# Patient Record
Sex: MALE | Race: WHITE | Employment: UNEMPLOYED | URBAN - METROPOLITAN AREA
[De-identification: names, ages, dates, MRNs, and addresses within clinical notes are randomized per-mention and may not be internally consistent; named-entity substitution may affect disease eponyms.]

---

## 2018-01-22 PROBLEM — Z12.11 ENCOUNTER FOR SCREENING FECAL OCCULT BLOOD TESTING: Status: ACTIVE | Noted: 2018-01-22

## 2018-01-22 PROBLEM — I10 ESSENTIAL HYPERTENSION: Status: ACTIVE | Noted: 2018-01-22

## 2018-01-22 PROBLEM — Z11.59 ENCOUNTER FOR HEPATITIS C SCREENING TEST FOR LOW RISK PATIENT: Status: ACTIVE | Noted: 2018-01-22

## 2018-01-22 PROBLEM — M19.90 ARTHRITIS: Status: ACTIVE | Noted: 2018-01-22

## 2018-01-22 PROBLEM — Z23 IMMUNIZATION DUE: Status: ACTIVE | Noted: 2018-01-22

## 2018-01-23 PROBLEM — J30.2 ACUTE SEASONAL ALLERGIC RHINITIS: Status: ACTIVE | Noted: 2018-01-23

## 2018-02-28 PROBLEM — Z23 IMMUNIZATION DUE: Status: RESOLVED | Noted: 2018-01-22 | Resolved: 2018-02-28

## 2018-02-28 PROBLEM — Z86.73 HISTORY OF STROKE: Status: ACTIVE | Noted: 2018-02-28

## 2018-02-28 PROBLEM — F41.9 ANXIETY: Status: ACTIVE | Noted: 2018-02-28

## 2018-02-28 PROBLEM — Z11.59 ENCOUNTER FOR HEPATITIS C SCREENING TEST FOR LOW RISK PATIENT: Status: RESOLVED | Noted: 2018-01-22 | Resolved: 2018-02-28

## 2018-02-28 PROBLEM — Z12.11 ENCOUNTER FOR SCREENING FECAL OCCULT BLOOD TESTING: Status: RESOLVED | Noted: 2018-01-22 | Resolved: 2018-02-28

## 2018-03-06 ENCOUNTER — HOSPITAL ENCOUNTER (OUTPATIENT)
Dept: ULTRASOUND IMAGING | Age: 63
Discharge: HOME | End: 2018-03-06
Attending: FAMILY MEDICINE
Payer: COMMERCIAL

## 2018-03-06 DIAGNOSIS — Z86.73 HISTORY OF STROKE: ICD-10-CM

## 2018-03-06 DIAGNOSIS — I10 ESSENTIAL HYPERTENSION: ICD-10-CM

## 2018-03-06 PROCEDURE — 93880 EXTRACRANIAL BILAT STUDY: CPT

## 2018-03-07 NOTE — PROGRESS NOTES
Reviewed results with the patient. He is requesting an opthomologist referral for cataracts and stigmatism correction.  Please advise

## 2018-03-28 PROBLEM — E66.01 SEVERE OBESITY (BMI 35.0-39.9) WITH COMORBIDITY (HCC): Status: ACTIVE | Noted: 2018-03-28

## 2018-04-23 RX ORDER — OXYCODONE HYDROCHLORIDE 5 MG/1
5 TABLET ORAL
Status: CANCELLED | OUTPATIENT
Start: 2018-04-23

## 2018-04-23 RX ORDER — HYDROMORPHONE HYDROCHLORIDE 2 MG/ML
0.5 INJECTION, SOLUTION INTRAMUSCULAR; INTRAVENOUS; SUBCUTANEOUS
Status: CANCELLED | OUTPATIENT
Start: 2018-04-23

## 2018-04-23 RX ORDER — NALOXONE HYDROCHLORIDE 0.4 MG/ML
0.1 INJECTION, SOLUTION INTRAMUSCULAR; INTRAVENOUS; SUBCUTANEOUS
Status: CANCELLED | OUTPATIENT
Start: 2018-04-23

## 2018-04-23 RX ORDER — FLUMAZENIL 0.1 MG/ML
0.2 INJECTION INTRAVENOUS
Status: CANCELLED | OUTPATIENT
Start: 2018-04-23

## 2018-04-23 RX ORDER — SODIUM CHLORIDE 0.9 % (FLUSH) 0.9 %
5-10 SYRINGE (ML) INJECTION AS NEEDED
Status: CANCELLED | OUTPATIENT
Start: 2018-04-23

## 2018-04-23 RX ORDER — NALBUPHINE HYDROCHLORIDE 20 MG/ML
5 INJECTION, SOLUTION INTRAMUSCULAR; INTRAVENOUS; SUBCUTANEOUS
Status: CANCELLED | OUTPATIENT
Start: 2018-04-23

## 2018-04-24 ENCOUNTER — HOSPITAL ENCOUNTER (OUTPATIENT)
Age: 63
Setting detail: OUTPATIENT SURGERY
Discharge: HOME | End: 2018-04-24
Attending: OPHTHALMOLOGY | Admitting: OPHTHALMOLOGY

## 2018-04-24 VITALS
SYSTOLIC BLOOD PRESSURE: 156 MMHG | RESPIRATION RATE: 18 BRPM | HEART RATE: 91 BPM | TEMPERATURE: 98.8 F | BODY MASS INDEX: 36.01 KG/M2 | WEIGHT: 251 LBS | DIASTOLIC BLOOD PRESSURE: 96 MMHG | OXYGEN SATURATION: 95 %

## 2018-04-24 RX ORDER — PHENYLEPHRINE HYDROCHLORIDE 25 MG/ML
1 SOLUTION/ DROPS OPHTHALMIC
Status: DISCONTINUED | OUTPATIENT
Start: 2018-04-24 | End: 2018-04-24 | Stop reason: HOSPADM

## 2018-04-24 RX ORDER — MIDAZOLAM HYDROCHLORIDE 1 MG/ML
2 INJECTION, SOLUTION INTRAMUSCULAR; INTRAVENOUS
Status: DISCONTINUED | OUTPATIENT
Start: 2018-04-24 | End: 2018-04-24 | Stop reason: HOSPADM

## 2018-04-24 RX ORDER — CYCLOPENTOLATE HYDROCHLORIDE 20 MG/ML
1 SOLUTION/ DROPS OPHTHALMIC
Status: DISCONTINUED | OUTPATIENT
Start: 2018-04-24 | End: 2018-04-24 | Stop reason: HOSPADM

## 2018-04-24 RX ORDER — LIDOCAINE HYDROCHLORIDE 10 MG/ML
0.1 INJECTION INFILTRATION; PERINEURAL AS NEEDED
Status: DISCONTINUED | OUTPATIENT
Start: 2018-04-24 | End: 2018-04-24 | Stop reason: HOSPADM

## 2018-04-24 RX ORDER — SODIUM CHLORIDE, SODIUM LACTATE, POTASSIUM CHLORIDE, CALCIUM CHLORIDE 600; 310; 30; 20 MG/100ML; MG/100ML; MG/100ML; MG/100ML
100 INJECTION, SOLUTION INTRAVENOUS CONTINUOUS
Status: DISCONTINUED | OUTPATIENT
Start: 2018-04-24 | End: 2018-04-24 | Stop reason: HOSPADM

## 2018-04-24 RX ORDER — SODIUM CHLORIDE 0.9 % (FLUSH) 0.9 %
5-10 SYRINGE (ML) INJECTION AS NEEDED
Status: DISCONTINUED | OUTPATIENT
Start: 2018-04-24 | End: 2018-04-24 | Stop reason: HOSPADM

## 2018-04-24 RX ORDER — TROPICAMIDE 10 MG/ML
1 SOLUTION/ DROPS OPHTHALMIC
Status: DISCONTINUED | OUTPATIENT
Start: 2018-04-24 | End: 2018-04-24 | Stop reason: HOSPADM

## 2018-04-24 RX ORDER — CIPROFLOXACIN HYDROCHLORIDE 3.5 MG/ML
1 SOLUTION/ DROPS TOPICAL
Status: DISCONTINUED | OUTPATIENT
Start: 2018-04-24 | End: 2018-04-24 | Stop reason: HOSPADM

## 2018-04-24 RX ORDER — SODIUM CHLORIDE 0.9 % (FLUSH) 0.9 %
5-10 SYRINGE (ML) INJECTION EVERY 8 HOURS
Status: DISCONTINUED | OUTPATIENT
Start: 2018-04-24 | End: 2018-04-24 | Stop reason: HOSPADM

## 2018-08-07 PROBLEM — E55.9 VITAMIN D DEFICIENCY: Status: ACTIVE | Noted: 2018-08-07

## 2018-08-07 PROBLEM — R26.9 GAIT ABNORMALITY: Status: ACTIVE | Noted: 2018-08-07

## 2019-04-15 ENCOUNTER — APPOINTMENT (OUTPATIENT)
Dept: GENERAL RADIOLOGY | Age: 64
DRG: 720 | End: 2019-04-15
Attending: EMERGENCY MEDICINE
Payer: COMMERCIAL

## 2019-04-15 ENCOUNTER — HOSPITAL ENCOUNTER (INPATIENT)
Age: 64
LOS: 1 days | Discharge: HOME | DRG: 720 | End: 2019-04-16
Attending: EMERGENCY MEDICINE | Admitting: INTERNAL MEDICINE
Payer: COMMERCIAL

## 2019-04-15 DIAGNOSIS — R06.03 RESPIRATORY DISTRESS: ICD-10-CM

## 2019-04-15 DIAGNOSIS — J18.9 COMMUNITY ACQUIRED PNEUMONIA OF RIGHT MIDDLE LOBE OF LUNG: Primary | ICD-10-CM

## 2019-04-15 LAB
ALBUMIN SERPL-MCNC: 2.7 G/DL (ref 3.2–4.6)
ALBUMIN/GLOB SERPL: 0.7 {RATIO} (ref 1.2–3.5)
ALP SERPL-CCNC: 73 U/L (ref 50–136)
ALT SERPL-CCNC: 24 U/L (ref 12–65)
ANION GAP SERPL CALC-SCNC: 7 MMOL/L (ref 7–16)
APPEARANCE UR: CLEAR
AST SERPL-CCNC: 19 U/L (ref 15–37)
ATRIAL RATE: 128 BPM
BACTERIA URNS QL MICRO: ABNORMAL /HPF
BASOPHILS # BLD: 0 K/UL (ref 0–0.2)
BASOPHILS NFR BLD: 0 % (ref 0–2)
BILIRUB SERPL-MCNC: 0.6 MG/DL (ref 0.2–1.1)
BILIRUB UR QL: NEGATIVE
BNP SERPL-MCNC: 60 PG/ML
BUN SERPL-MCNC: 11 MG/DL (ref 8–23)
CALCIUM SERPL-MCNC: 8 MG/DL (ref 8.3–10.4)
CALCULATED P AXIS, ECG09: 75 DEGREES
CALCULATED R AXIS, ECG10: -7 DEGREES
CALCULATED T AXIS, ECG11: 62 DEGREES
CHLORIDE SERPL-SCNC: 103 MMOL/L (ref 98–107)
CO2 SERPL-SCNC: 25 MMOL/L (ref 21–32)
COLOR UR: YELLOW
CREAT SERPL-MCNC: 1.17 MG/DL (ref 0.8–1.5)
DIAGNOSIS, 93000: NORMAL
DIFFERENTIAL METHOD BLD: ABNORMAL
EOSINOPHIL # BLD: 0 K/UL (ref 0–0.8)
EOSINOPHIL NFR BLD: 0 % (ref 0.5–7.8)
ERYTHROCYTE [DISTWIDTH] IN BLOOD BY AUTOMATED COUNT: 13.4 % (ref 11.9–14.6)
GLOBULIN SER CALC-MCNC: 3.7 G/DL (ref 2.3–3.5)
GLUCOSE SERPL-MCNC: 113 MG/DL (ref 65–100)
GLUCOSE UR STRIP.AUTO-MCNC: NEGATIVE MG/DL
HCT VFR BLD AUTO: 42.7 % (ref 41.1–50.3)
HGB BLD-MCNC: 14 G/DL (ref 13.6–17.2)
HGB UR QL STRIP: ABNORMAL
IMM GRANULOCYTES # BLD AUTO: 0.1 K/UL (ref 0–0.5)
IMM GRANULOCYTES NFR BLD AUTO: 0 % (ref 0–5)
KETONES UR QL STRIP.AUTO: NEGATIVE MG/DL
LACTATE BLD-SCNC: 1.23 MMOL/L (ref 0.5–1.9)
LEUKOCYTE ESTERASE UR QL STRIP.AUTO: NEGATIVE
LYMPHOCYTES # BLD: 1 K/UL (ref 0.5–4.6)
LYMPHOCYTES NFR BLD: 8 % (ref 13–44)
MCH RBC QN AUTO: 31 PG (ref 26.1–32.9)
MCHC RBC AUTO-ENTMCNC: 32.8 G/DL (ref 31.4–35)
MCV RBC AUTO: 94.7 FL (ref 79.6–97.8)
MONOCYTES # BLD: 1.7 K/UL (ref 0.1–1.3)
MONOCYTES NFR BLD: 13 % (ref 4–12)
NEUTS SEG # BLD: 10.3 K/UL (ref 1.7–8.2)
NEUTS SEG NFR BLD: 79 % (ref 43–78)
NITRITE UR QL STRIP.AUTO: NEGATIVE
NRBC # BLD: 0 K/UL (ref 0–0.2)
P-R INTERVAL, ECG05: 168 MS
PH UR STRIP: 6 [PH] (ref 5–9)
PLATELET # BLD AUTO: 284 K/UL (ref 150–450)
PMV BLD AUTO: 12.1 FL (ref 9.4–12.3)
POTASSIUM SERPL-SCNC: 3.5 MMOL/L (ref 3.5–5.1)
PROCALCITONIN SERPL-MCNC: 0.2 NG/ML
PROT SERPL-MCNC: 6.4 G/DL (ref 6.3–8.2)
PROT UR STRIP-MCNC: ABNORMAL MG/DL
Q-T INTERVAL, ECG07: 288 MS
QRS DURATION, ECG06: 76 MS
QTC CALCULATION (BEZET), ECG08: 420 MS
RBC # BLD AUTO: 4.51 M/UL (ref 4.23–5.6)
SODIUM SERPL-SCNC: 135 MMOL/L (ref 136–145)
SP GR UR REFRACTOMETRY: 1.01 (ref 1–1.02)
TROPONIN I BLD-MCNC: 0 NG/ML (ref 0.02–0.05)
UROBILINOGEN UR QL STRIP.AUTO: 0.2 EU/DL (ref 0.2–1)
VENTRICULAR RATE, ECG03: 128 BPM
WBC # BLD AUTO: 13.1 K/UL (ref 4.3–11.1)

## 2019-04-15 PROCEDURE — 96360 HYDRATION IV INFUSION INIT: CPT | Performed by: EMERGENCY MEDICINE

## 2019-04-15 PROCEDURE — 71046 X-RAY EXAM CHEST 2 VIEWS: CPT

## 2019-04-15 PROCEDURE — 99285 EMERGENCY DEPT VISIT HI MDM: CPT | Performed by: EMERGENCY MEDICINE

## 2019-04-15 PROCEDURE — 83605 ASSAY OF LACTIC ACID: CPT

## 2019-04-15 PROCEDURE — 94640 AIRWAY INHALATION TREATMENT: CPT

## 2019-04-15 PROCEDURE — 74011250636 HC RX REV CODE- 250/636: Performed by: EMERGENCY MEDICINE

## 2019-04-15 PROCEDURE — 80053 COMPREHEN METABOLIC PANEL: CPT

## 2019-04-15 PROCEDURE — 87449 NOS EACH ORGANISM AG IA: CPT

## 2019-04-15 PROCEDURE — 74011250636 HC RX REV CODE- 250/636: Performed by: INTERNAL MEDICINE

## 2019-04-15 PROCEDURE — 81001 URINALYSIS AUTO W/SCOPE: CPT

## 2019-04-15 PROCEDURE — 83880 ASSAY OF NATRIURETIC PEPTIDE: CPT

## 2019-04-15 PROCEDURE — 74011250637 HC RX REV CODE- 250/637: Performed by: INTERNAL MEDICINE

## 2019-04-15 PROCEDURE — 74011000250 HC RX REV CODE- 250: Performed by: INTERNAL MEDICINE

## 2019-04-15 PROCEDURE — 87040 BLOOD CULTURE FOR BACTERIA: CPT

## 2019-04-15 PROCEDURE — 65660000000 HC RM CCU STEPDOWN

## 2019-04-15 PROCEDURE — 84145 PROCALCITONIN (PCT): CPT

## 2019-04-15 PROCEDURE — 84484 ASSAY OF TROPONIN QUANT: CPT

## 2019-04-15 PROCEDURE — 85025 COMPLETE CBC W/AUTO DIFF WBC: CPT

## 2019-04-15 PROCEDURE — 93005 ELECTROCARDIOGRAM TRACING: CPT | Performed by: EMERGENCY MEDICINE

## 2019-04-15 PROCEDURE — 36415 COLL VENOUS BLD VENIPUNCTURE: CPT

## 2019-04-15 PROCEDURE — 74011000258 HC RX REV CODE- 258: Performed by: EMERGENCY MEDICINE

## 2019-04-15 RX ORDER — HYDRALAZINE HYDROCHLORIDE 20 MG/ML
10 INJECTION INTRAMUSCULAR; INTRAVENOUS
Status: DISCONTINUED | OUTPATIENT
Start: 2019-04-15 | End: 2019-04-16 | Stop reason: HOSPADM

## 2019-04-15 RX ORDER — ACETAMINOPHEN 325 MG/1
650 TABLET ORAL
Status: DISCONTINUED | OUTPATIENT
Start: 2019-04-15 | End: 2019-04-16 | Stop reason: HOSPADM

## 2019-04-15 RX ORDER — HEPARIN SODIUM 5000 [USP'U]/ML
5000 INJECTION, SOLUTION INTRAVENOUS; SUBCUTANEOUS EVERY 12 HOURS
Status: DISCONTINUED | OUTPATIENT
Start: 2019-04-15 | End: 2019-04-16 | Stop reason: HOSPADM

## 2019-04-15 RX ORDER — ALBUTEROL SULFATE 0.83 MG/ML
2.5 SOLUTION RESPIRATORY (INHALATION)
Status: DISCONTINUED | OUTPATIENT
Start: 2019-04-15 | End: 2019-04-16 | Stop reason: HOSPADM

## 2019-04-15 RX ORDER — TRAMADOL HYDROCHLORIDE 50 MG/1
50 TABLET ORAL
Status: DISCONTINUED | OUTPATIENT
Start: 2019-04-15 | End: 2019-04-16 | Stop reason: HOSPADM

## 2019-04-15 RX ORDER — ZOLPIDEM TARTRATE 5 MG/1
5 TABLET ORAL
Status: DISPENSED | OUTPATIENT
Start: 2019-04-15 | End: 2019-04-16

## 2019-04-15 RX ORDER — HEPARIN SODIUM 5000 [USP'U]/ML
5000 INJECTION, SOLUTION INTRAVENOUS; SUBCUTANEOUS EVERY 12 HOURS
Status: CANCELLED | OUTPATIENT
Start: 2019-04-15

## 2019-04-15 RX ORDER — ATORVASTATIN CALCIUM 40 MG/1
40 TABLET, FILM COATED ORAL
Status: DISCONTINUED | OUTPATIENT
Start: 2019-04-15 | End: 2019-04-16 | Stop reason: HOSPADM

## 2019-04-15 RX ORDER — SODIUM CHLORIDE 9 MG/ML
125 INJECTION, SOLUTION INTRAVENOUS CONTINUOUS
Status: DISCONTINUED | OUTPATIENT
Start: 2019-04-15 | End: 2019-04-16 | Stop reason: HOSPADM

## 2019-04-15 RX ORDER — HYDROCODONE BITARTRATE AND HOMATROPINE METHYLBROMIDE 1.5; 5 MG/5ML; MG/5ML
5 SYRUP ORAL
Status: DISCONTINUED | OUTPATIENT
Start: 2019-04-15 | End: 2019-04-16 | Stop reason: HOSPADM

## 2019-04-15 RX ORDER — ASPIRIN 81 MG/1
81 TABLET ORAL DAILY
Status: DISCONTINUED | OUTPATIENT
Start: 2019-04-16 | End: 2019-04-16 | Stop reason: HOSPADM

## 2019-04-15 RX ORDER — SODIUM CHLORIDE 0.9 % (FLUSH) 0.9 %
5-40 SYRINGE (ML) INJECTION AS NEEDED
Status: DISCONTINUED | OUTPATIENT
Start: 2019-04-15 | End: 2019-04-16 | Stop reason: HOSPADM

## 2019-04-15 RX ORDER — ONDANSETRON 2 MG/ML
4 INJECTION INTRAMUSCULAR; INTRAVENOUS
Status: DISCONTINUED | OUTPATIENT
Start: 2019-04-15 | End: 2019-04-16 | Stop reason: HOSPADM

## 2019-04-15 RX ORDER — SODIUM CHLORIDE 0.9 % (FLUSH) 0.9 %
5-40 SYRINGE (ML) INJECTION EVERY 8 HOURS
Status: DISCONTINUED | OUTPATIENT
Start: 2019-04-15 | End: 2019-04-16 | Stop reason: HOSPADM

## 2019-04-15 RX ORDER — GUAIFENESIN 600 MG/1
600 TABLET, EXTENDED RELEASE ORAL EVERY 12 HOURS
Status: DISCONTINUED | OUTPATIENT
Start: 2019-04-15 | End: 2019-04-16 | Stop reason: HOSPADM

## 2019-04-15 RX ORDER — BUDESONIDE 0.5 MG/2ML
500 INHALANT ORAL
Status: DISCONTINUED | OUTPATIENT
Start: 2019-04-15 | End: 2019-04-16 | Stop reason: HOSPADM

## 2019-04-15 RX ORDER — FLUTICASONE PROPIONATE 50 MCG
2 SPRAY, SUSPENSION (ML) NASAL DAILY
Status: DISCONTINUED | OUTPATIENT
Start: 2019-04-16 | End: 2019-04-16 | Stop reason: HOSPADM

## 2019-04-15 RX ADMIN — SODIUM CHLORIDE 125 ML/HR: 900 INJECTION, SOLUTION INTRAVENOUS at 23:00

## 2019-04-15 RX ADMIN — AZITHROMYCIN MONOHYDRATE 500 MG: 500 INJECTION, POWDER, LYOPHILIZED, FOR SOLUTION INTRAVENOUS at 20:45

## 2019-04-15 RX ADMIN — ATORVASTATIN CALCIUM 40 MG: 40 TABLET, FILM COATED ORAL at 22:10

## 2019-04-15 RX ADMIN — HYDROCODONE BITARTRATE AND HOMATROPINE METHYLBROMIDE 5 ML: 5; 1.5 SOLUTION ORAL at 22:10

## 2019-04-15 RX ADMIN — CEFTRIAXONE 2 G: 2 INJECTION, POWDER, FOR SOLUTION INTRAMUSCULAR; INTRAVENOUS at 21:15

## 2019-04-15 RX ADMIN — BUDESONIDE 500 MCG: 0.5 INHALANT RESPIRATORY (INHALATION) at 22:49

## 2019-04-15 RX ADMIN — GUAIFENESIN 600 MG: 600 TABLET ORAL at 22:10

## 2019-04-15 RX ADMIN — FAMOTIDINE 20 MG: 10 INJECTION, SOLUTION INTRAVENOUS at 22:10

## 2019-04-15 RX ADMIN — ALBUTEROL SULFATE 2.5 MG: 2.5 SOLUTION RESPIRATORY (INHALATION) at 22:49

## 2019-04-15 RX ADMIN — HEPARIN SODIUM 5000 UNITS: 5000 INJECTION INTRAVENOUS; SUBCUTANEOUS at 22:10

## 2019-04-15 RX ADMIN — SODIUM CHLORIDE 1000 ML: 900 INJECTION, SOLUTION INTRAVENOUS at 18:15

## 2019-04-15 NOTE — H&P
HOSPITALIST H&P/CONSULTNAME:  Dayana Celaya Age:  59 y.o. 
:   1955 MRN:   [id number] PCP: Eros Vanessa MD 
Consulting MD: Treatment Team: Attending Provider: Getachew Spear MD; Primary Nurse: Peng Joshi RN 
HPI:  
 
CC: cough and SOB This is a 60 YO male patient with a PMH of HTN, previous hemorrhagic CVA, renal stones, obesity who came to the ER complaining of 10 days of having palpitations, progressive SOB, cough with yellow sputum production. He denies fever or chills. He also complains of epigastric pain, burning in nature, 5/10 and he also has bilateral pleuritic chest pain. The patient also reported diarrhea with watery stool for the last 2 days. In the ER he was found to have BP of 146/75, , RR 20, O2 sat 89%. He was in moderate distress and had rhonchi in the R lung area. He was tachycardic. No leg edema found. His initial blood workup reported 13K, his BMP is still pending, his chest x ray reported a R middle lobe infiltrate. His EKG showed sinus tachy and his initial lactic acid was 1.23. He was started on IV ceftriaxone + zithromax. Received IV fluids and nebs and was presented to the hospitalist for admission. Case was discussed with ER MD. 
10 point ROS done and is negative except as noted in HPI. Past Medical History:  
Diagnosis Date  Anxiety  Asthma  Back pain, chronic  Calculus of kidney  GERD (gastroesophageal reflux disease)  Hypercholesterolemia  Hypertension  Osteoarthritis  Rheumatoid arthritis ([facility] [address]  [facility]  Thromboembolus ([facility] No past surgical history on file. Prior to Admission Medications Prescriptions Last Dose Informant Patient Reported? Taking? OTHER   No No  
Sig: Shoe insert for lateral heal strike gait  
aspirin delayed-release 81 mg tablet   Yes No  
Sig: Take 81 mg by mouth daily. atorvastatin (LIPITOR) 80 mg tablet   No No  
Sig: Take 1 Tab by mouth daily. fluticasone (FLONASE) 50 mcg/actuation nasal spray   No No  
Si Sprays by Both Nostrils route daily. glucosamine HCl/chondroitin oliveros (GLUCOSAMINE-CHONDROITIN PO)   Yes No  
Sig: Take  by mouth. hydroCHLOROthiazide (HYDRODIURIL) 25 mg tablet   No No  
Sig: TAKE ONE TABLET BY MOUTH EVERY DAY  
lisinopril (PRINIVIL, ZESTRIL) 40 mg tablet   No No  
Sig: TAKE ONE TABLET BY MOUTH EVERY DAY Facility-Administered Medications: None Home meds reconciled. No Known Allergies Social History Tobacco Use  Smoking status: Former Smoker  Smokeless tobacco: Never Used Substance Use Topics  Alcohol use: Yes Alcohol/week: 0.0 oz  
  Comment: occasional   
  
Family History Problem Relation Age of Onset  Diabetes Mother  Heart Disease Mother  Stroke Mother  Hypertension Mother  No Known Problems Father Immunization History Administered Date(s) Administered  Tdap 2013 Objective:  
 
Visit Vitals /70 Pulse (!) 149 Temp 98.9 °F (37.2 °C) Resp 20 Ht 5' 10\" (1.778 m) Wt 117.9 kg (260 lb) SpO2 92% BMI 37.31 kg/m² Temp (24hrs), Av.9 °F (37.2 °C), Min:98.9 °F (37.2 °C), Max:98.9 °F (37.2 °C) Oxygen Therapy O2 Sat (%): 92 % (04/15/19 180) Pulse via Oximetry: 149 beats per minute (04/15/19 1801) O2 Device: Nasal cannula (04/15/19 1813) O2 Flow Rate (L/min): 2 l/min (04/15/19 1813) Physical Exam: 
General:    Alert, cooperative, moderate distress Head:   NCAT. No obvious deformity Nose:  Nares normal. No drainage Lungs:   Rhonchi in the R lung area Heart:   RRR. No m/r/g. Abdomen:   S/nt/nd. Bowel sounds normal.  
Extremities: No cyanosis. Skin:     No rashes or lesions. Not Jaundiced Neurologic: Moves all extremities. no gross focal deficits Data Review:  
Recent Results (from the past 24 hour(s)) EKG, 12 LEAD, INITIAL Collection Time: 04/15/19  2:29 PM  
Result Value Ref Range Ventricular Rate 128 BPM  
 Atrial Rate 128 BPM  
 P-R Interval 168 ms QRS Duration 76 ms  
 Q-T Interval 288 ms QTC Calculation (Bezet) 420 ms Calculated P Axis 75 degrees Calculated R Axis -7 degrees Calculated T Axis 62 degrees Diagnosis Sinus tachycardia with Premature atrial complexes Low voltage QRS Cannot rule out Anterior infarct , age undetermined Abnormal ECG When compared with ECG of 06-MAY-2016 20:03, 
Premature atrial complexes are now Present Minimal criteria for Anterior infarct are now Present Confirmed by Bakari Raphael MD (), Helen Pacheco ([id number]) on 4/15/2019 5:44:27 PM 
  
CBC WITH AUTOMATED DIFF Collection Time: 04/15/19  2:42 PM  
Result Value Ref Range WBC 13.1 (H) 4.3 - 11.1 K/uL  
 RBC 4.51 4.23 - 5.6 M/uL  
 HGB 14.0 13.6 - 17.2 g/dL HCT 42.7 41.1 - 50.3 % MCV 94.7 79.6 - 97.8 FL  
 MCH 31.0 26.1 - 32.9 PG  
 MCHC 32.8 31.4 - 35.0 g/dL  
 RDW 13.4 11.9 - 14.6 % PLATELET 465 014 - 581 K/uL MPV 12.1 9.4 - 12.3 FL ABSOLUTE NRBC 0.00 0.0 - 0.2 K/uL  
 DF AUTOMATED NEUTROPHILS 79 (H) 43 - 78 % LYMPHOCYTES 8 (L) 13 - 44 % MONOCYTES 13 (H) 4.0 - 12.0 % EOSINOPHILS 0 (L) 0.5 - 7.8 % BASOPHILS 0 0.0 - 2.0 % IMMATURE GRANULOCYTES 0 0.0 - 5.0 %  
 ABS. NEUTROPHILS 10.3 (H) 1.7 - 8.2 K/UL  
 ABS. LYMPHOCYTES 1.0 0.5 - 4.6 K/UL  
 ABS. MONOCYTES 1.7 (H) 0.1 - 1.3 K/UL  
 ABS. EOSINOPHILS 0.0 0.0 - 0.8 K/UL  
 ABS. BASOPHILS 0.0 0.0 - 0.2 K/UL  
 ABS. IMM. GRANS. 0.1 0.0 - 0.5 K/UL BNP Collection Time: 04/15/19  2:42 PM  
Result Value Ref Range BNP 60 (H) 0 pg/mL POC LACTIC ACID Collection Time: 04/15/19  2:43 PM  
Result Value Ref Range Lactic Acid (POC) 1.23 0.5 - 1.9 mmol/L  
POC TROPONIN-I Collection Time: 04/15/19  2:45 PM  
Result Value Ref Range Troponin-I (POC) 0 (L) 0.02 - 0.05 ng/ml Imaging /Procedures /Studies: 
I personally reviewed all labs, imaging, and other studies this admission: CXR reviewed by me. EKG reviewed by me. CXR Results  (Last 48 hours) 04/15/19 1615  XR CHEST PA LAT Final result Impression:  IMPRESSION:  Right middle lobe infiltrate Narrative:  PA AND LATERAL CHEST X-RAY HISTORY: Shortest of breath with cough COMPARISON:  None FINDINGS:  PA and lateral views demonstrate patchy infiltrate in the right  
middle lobe. Costophrenic angles are clear. Heart is not enlarged. CT Results  (Last 48 hours) None Assessment and Plan: Active Hospital Problems Diagnosis Date Noted  Pneumonia 04/15/2019 PLAN #  Sepsis and Acute hypoxic respiratory failure due to pneumonia  
-IV fluids -IV antibiotics ( Ceftriaxone + Zithromax) -nebs 
-guaifenesin bid  
-patient has diarrhea. Will check for Legionella pneumophila  
-BMP still pending # HTN 
-using lisinopril 40 mg po daily at home  
-BMP pending to be resulted. Not sure how good his renal function is at this point 
-will hold lisinopril and will order hydralazine IV prn. If his renal function is reported normal, his lisinopril can be resumed tomorrow  
-Ran out of HCTZ months ago #history of previous CVA 
-continue ASA and Lipitor FEN:  Cardiac diet DVT ppx:  SQ heparin Code status:  Full Estimated LOS:  > 2MN Risk assessment:  High  
Plan of care discussed with: patient Signed By: Radha Alanis MD   
 April 15, 2019

## 2019-04-15 NOTE — ED PROVIDER NOTES
Patient presents to the ER complaining of shortness of breath and elevated heart rate. Patient reports he's had an elevated heart rate for the past couple of days. Reports some shortness of breath and lightheadedness as well. Denies any fever. Does report some cough. Upon arrival, patient is hypoxic with a sat of 89%, dizziness, sinus tachycardia. We'll obtain screening labs, EKG, chest x-ray Triage examination and history was performed by me, further examination and history to be performed by other provider. Ad Lin MD 
 
 
 
 
 
Patient with cough and shortness of breath for the past 3 weeks getting worse. It is also very anxious. Has had subjective fever recently stating it broke yesterday. Presents today with continued symptoms. The history is provided by the patient. No  was used. Shortness of Breath This is a new problem. The problem occurs continuously. The current episode started more than 1 week ago. The problem has been gradually worsening. Associated symptoms include a fever (subjective), cough and sputum production. Pertinent negatives include no headaches, no rhinorrhea, no sore throat, no neck pain, no wheezing, no orthopnea, no chest pain, no vomiting, no abdominal pain, no rash, no leg pain and no leg swelling. He has tried nothing for the symptoms. Past Medical History:  
Diagnosis Date  Anxiety  Asthma  Back pain, chronic  Calculus of kidney  GERD (gastroesophageal reflux disease)  Hypercholesterolemia  Hypertension  Osteoarthritis  Rheumatoid arthritis ([address]  Stroke ([facility] [facility]  Thromboembolus ([facility] [address] No past surgical history on file. Family History:  
Problem Relation Age of Onset  Diabetes Mother  Heart Disease Mother  Stroke Mother  Hypertension Mother  No Known Problems Father Social History Socioeconomic History  Marital status: SINGLE Spouse name: Not on file  Number of children: Not on file  Years of education: Not on file  Highest education level: Not on file Occupational History  Not on file Social Needs  Financial resource strain: Not on file  Food insecurity:  
  Worry: Not on file Inability: Not on file  Transportation needs:  
  Medical: Not on file Non-medical: Not on file Tobacco Use  Smoking status: Former Smoker  Smokeless tobacco: Never Used Substance and Sexual Activity  Alcohol use: Yes Alcohol/week: 0.0 oz  
  Comment: occasional   
 Drug use: Not on file  Sexual activity: Not on file Lifestyle  Physical activity:  
  Days per week: Not on file Minutes per session: Not on file  Stress: Not on file Relationships  Social connections:  
  Talks on phone: Not on file Gets together: Not on file Attends Jew service: Not on file Active member of club or organization: Not on file Attends meetings of clubs or organizations: Not on file Relationship status: Not on file  Intimate partner violence:  
  Fear of current or ex partner: Not on file Emotionally abused: Not on file Physically abused: Not on file Forced sexual activity: Not on file Other Topics Concern  Not on file Social History Narrative  Not on file ALLERGIES: Patient has no known allergies. Review of Systems Constitutional: Positive for fever (subjective). Negative for chills. HENT: Negative for rhinorrhea and sore throat. Eyes: Negative for pain and redness. Respiratory: Positive for cough, sputum production and shortness of breath. Negative for chest tightness and wheezing. Cardiovascular: Negative for chest pain, orthopnea and leg swelling. Gastrointestinal: Negative for abdominal pain, diarrhea, nausea and vomiting. Genitourinary: Negative for dysuria and hematuria. Musculoskeletal: Negative for back pain, gait problem, neck pain and neck stiffness. Skin: Negative for color change and rash. Neurological: Negative for weakness, numbness and headaches. Vitals:  
 04/15/19 1429 BP: (!) 173/96 Pulse: (!) 124 Resp: 20 Temp: 98.9 °F (37.2 °C) SpO2: (!) 89% Weight: 117.9 kg (260 lb) Height: 5' 10\" (1.778 m) Physical Exam  
Constitutional: He is oriented to person, place, and time. He appears well-developed and well-nourished. HENT:  
Head: Normocephalic and atraumatic. Neck: Normal range of motion. Neck supple. Cardiovascular: Regular rhythm. Tachycardia present. Pulmonary/Chest: He is in respiratory distress. He has no wheezes. Coarse on right Abdominal: Soft. Bowel sounds are normal. There is no tenderness. Musculoskeletal: Normal range of motion. He exhibits no edema. Neurological: He is alert and oriented to person, place, and time. Skin: Skin is warm and dry. MDM Number of Diagnoses or Management Options Diagnosis management comments: Patient with right middle lobe infiltrate. Tachycardic and short of breath with increased effort. Subjective fever yesterday. Will admit to the hospital for antibiotics. Amount and/or Complexity of Data Reviewed Clinical lab tests: ordered and reviewed Tests in the radiology section of CPT®: ordered and reviewed Tests in the medicine section of CPT®: ordered and reviewed Patient Progress Patient progress: stable Procedures EKG: nonspecific ST and T waves changes, sinus tachycardia, PAC's noted. Rate 128. XR CHEST PA LAT (Final result) Result time 04/15/19 16:20:33 Final result by Lorraine Joseph MD (04/15/19 16:20:33) Impression:  
 IMPRESSION:  Right middle lobe infiltrate Narrative:  
 PA AND LATERAL CHEST X-RAY HISTORY: Shortest of breath with cough COMPARISON:  None FINDINGS:  PA and lateral views demonstrate patchy infiltrate in the right middle lobe. Costophrenic angles are clear. Heart is not enlarged. Results Include: 
 
Recent Results (from the past 24 hour(s)) EKG, 12 LEAD, INITIAL Collection Time: 04/15/19  2:29 PM  
Result Value Ref Range Ventricular Rate 128 BPM  
 Atrial Rate 128 BPM  
 P-R Interval 168 ms QRS Duration 76 ms  
 Q-T Interval 288 ms QTC Calculation (Bezet) 420 ms Calculated P Axis 75 degrees Calculated R Axis -7 degrees Calculated T Axis 62 degrees Diagnosis Sinus tachycardia with Premature atrial complexes Low voltage QRS Cannot rule out Anterior infarct , age undetermined Abnormal ECG When compared with ECG of 06-MAY-2016 20:03, 
Premature atrial complexes are now Present Minimal criteria for Anterior infarct are now Present Confirmed by Melissa Carter MD (), Myriam Whitley ([id number]) on 4/15/2019 5:44:27 PM 
  
CBC WITH AUTOMATED DIFF Collection Time: 04/15/19  2:42 PM  
Result Value Ref Range WBC 13.1 (H) 4.3 - 11.1 K/uL  
 RBC 4.51 4.23 - 5.6 M/uL  
 HGB 14.0 13.6 - 17.2 g/dL HCT 42.7 41.1 - 50.3 % MCV 94.7 79.6 - 97.8 FL  
 MCH 31.0 26.1 - 32.9 PG  
 MCHC 32.8 31.4 - 35.0 g/dL  
 RDW 13.4 11.9 - 14.6 % PLATELET 615 508 - 990 K/uL MPV 12.1 9.4 - 12.3 FL ABSOLUTE NRBC 0.00 0.0 - 0.2 K/uL  
 DF AUTOMATED NEUTROPHILS 79 (H) 43 - 78 % LYMPHOCYTES 8 (L) 13 - 44 % MONOCYTES 13 (H) 4.0 - 12.0 % EOSINOPHILS 0 (L) 0.5 - 7.8 % BASOPHILS 0 0.0 - 2.0 % IMMATURE GRANULOCYTES 0 0.0 - 5.0 %  
 ABS. NEUTROPHILS 10.3 (H) 1.7 - 8.2 K/UL  
 ABS. LYMPHOCYTES 1.0 0.5 - 4.6 K/UL  
 ABS. MONOCYTES 1.7 (H) 0.1 - 1.3 K/UL  
 ABS. EOSINOPHILS 0.0 0.0 - 0.8 K/UL  
 ABS. BASOPHILS 0.0 0.0 - 0.2 K/UL  
 ABS. IMM. GRANS. 0.1 0.0 - 0.5 K/UL BNP Collection Time: 04/15/19  2:42 PM  
Result Value Ref Range BNP 60 (H) 0 pg/mL POC LACTIC ACID Collection Time: 04/15/19  2:43 PM  
Result Value Ref Range  Lactic Acid (POC) 1.23 0.5 - 1.9 mmol/L  
POC TROPONIN-I  
 Collection Time: 04/15/19  2:45 PM  
Result Value Ref Range Troponin-I (POC) 0 (L) 0.02 - 0.05 ng/ml

## 2019-04-15 NOTE — ED NOTES
This RN unable to draw blood cultures after 2 sticks. Charge nurse notified. Patient requests phlebotomoist to obtain cultures. Lab was called to obtain cultures.

## 2019-04-16 VITALS
HEIGHT: 70 IN | DIASTOLIC BLOOD PRESSURE: 49 MMHG | WEIGHT: 260 LBS | RESPIRATION RATE: 19 BRPM | BODY MASS INDEX: 37.22 KG/M2 | HEART RATE: 88 BPM | TEMPERATURE: 98.9 F | OXYGEN SATURATION: 95 % | SYSTOLIC BLOOD PRESSURE: 97 MMHG

## 2019-04-16 LAB
ALBUMIN SERPL-MCNC: 2.4 G/DL (ref 3.2–4.6)
ALBUMIN/GLOB SERPL: 0.6 {RATIO} (ref 1.2–3.5)
ALP SERPL-CCNC: 70 U/L (ref 50–136)
ALT SERPL-CCNC: 17 U/L (ref 12–65)
ANION GAP SERPL CALC-SCNC: 8 MMOL/L (ref 7–16)
AST SERPL-CCNC: 18 U/L (ref 15–37)
BILIRUB SERPL-MCNC: 0.3 MG/DL (ref 0.2–1.1)
BUN SERPL-MCNC: 13 MG/DL (ref 8–23)
CALCIUM SERPL-MCNC: 7.9 MG/DL (ref 8.3–10.4)
CHLORIDE SERPL-SCNC: 107 MMOL/L (ref 98–107)
CO2 SERPL-SCNC: 25 MMOL/L (ref 21–32)
CREAT SERPL-MCNC: 1.22 MG/DL (ref 0.8–1.5)
ERYTHROCYTE [DISTWIDTH] IN BLOOD BY AUTOMATED COUNT: 13.1 % (ref 11.9–14.6)
GLOBULIN SER CALC-MCNC: 4.1 G/DL (ref 2.3–3.5)
GLUCOSE SERPL-MCNC: 117 MG/DL (ref 65–100)
HCT VFR BLD AUTO: 35.6 % (ref 41.1–50.3)
HGB BLD-MCNC: ABNORMAL G/DL (ref 13.6–17.2)
MAGNESIUM SERPL-MCNC: 2.2 MG/DL (ref 1.8–2.4)
MCH RBC QN AUTO: 31 PG (ref 26.1–32.9)
MCHC RBC AUTO-ENTMCNC: 32 G/DL (ref 31.4–35)
MCV RBC AUTO: 96.7 FL (ref 79.6–97.8)
NRBC # BLD: 0 K/UL (ref 0–0.2)
PLATELET # BLD AUTO: 205 K/UL (ref 150–450)
PMV BLD AUTO: 10.7 FL (ref 9.4–12.3)
POTASSIUM SERPL-SCNC: 3.5 MMOL/L (ref 3.5–5.1)
PROT SERPL-MCNC: 6.5 G/DL (ref 6.3–8.2)
RBC # BLD AUTO: 3.68 M/UL (ref 4.23–5.6)
SODIUM SERPL-SCNC: 140 MMOL/L (ref 136–145)
WBC # BLD AUTO: 10.2 K/UL (ref 4.3–11.1)

## 2019-04-16 PROCEDURE — 80053 COMPREHEN METABOLIC PANEL: CPT

## 2019-04-16 PROCEDURE — 85027 COMPLETE CBC AUTOMATED: CPT

## 2019-04-16 PROCEDURE — 83735 ASSAY OF MAGNESIUM: CPT

## 2019-04-16 RX ORDER — LEVOFLOXACIN 750 MG/1
750 TABLET ORAL DAILY
Qty: 6 TAB | Refills: 0 | Status: SHIPPED | OUTPATIENT
Start: 2019-04-16 | End: 2019-04-22

## 2019-04-16 RX ORDER — GUAIFENESIN 600 MG/1
600 TABLET, EXTENDED RELEASE ORAL 2 TIMES DAILY
Qty: 6 TAB | Refills: 0 | Status: SHIPPED | OUTPATIENT
Start: 2019-04-16 | End: 2019-04-19

## 2019-04-16 RX ORDER — ALBUTEROL SULFATE 90 UG/1
2 AEROSOL, METERED RESPIRATORY (INHALATION)
Qty: 1 INHALER | Refills: 0 | Status: SHIPPED | OUTPATIENT
Start: 2019-04-16 | End: 2019-05-01 | Stop reason: SDUPTHER

## 2019-04-16 NOTE — DISCHARGE SUMMARY
Hospitalist Discharge Summary     Patient ID:  Praful Galdamez  [id number]  36 y.o.  1955  Admit date: 4/15/2019  5:42 PM  Discharge date and time: 4/16/2019  Attending: Kevin Garza,*  PCP:  Estella Knox MD  Treatment Team: Attending Provider: Mariella Batista MD; Primary Nurse: Alexandria Raphael RN    Principal Diagnosis Pneumonia Sepsis. Active Problems:    Pneumonia (4/15/2019)     Hospital Course:  Please refer to the admission H&P for details of presentation. In summary, the patient is a 60 YO male patient with a PMH of HTN, previous hemorrhagic CVA, renal stones, obesity who came to the ER complaining of 10 days of having palpitations, progressive SOB, cough with yellow sputum production, epigastric pain, bilateral pleuritic chest pain, diarrhea. In the ER he was found to have BP of 146/75, , RR 20, O2 sat 89%. He was in moderate distress and had rhonchi in the R lung area. WBC 13K. Pneumonia on CXR: R middle lobe infiltrate. His EKG showed sinus tachy and his initial lactic acid was 1.23. He was started on IV ceftriaxone + zithromax, nebs. Received IV fluids for sepsis and was presented to the hospitalist for admission. Procalc 0.2, Blood cultures and Legionella Pending. Able to wean off O2. States he has walked out to his car in the parking lot and back. Patient states, \"I drink a half gallon od coffee a day and if the bottle doesn't say mountain dew, I don't drink it. \" Advised on staying hydrated and amenable to gatoraid. Will give rx for levaquin and advise no extreme activity like basketball or weekend warrior for risk of tendon rupture. Patient was advised to complete admission and anticipate he would need an additional 48 hours of IV antibiotic therapy, nebs, IVFs. Risk of leaving up to and including death, lack of response to completely treat the infection, hypoxia, brain injury, described. Patient is adamant to leave AMA.  Patient has the capacity to understand and is competent to make decisions. Patient states, \"I am just as good as a doctor, I have google. \" \"I don't trust doctors. They advised to pull the pull on me and that I would never walk again. \" \"I will not stay. \"  His  is at his bedside who confirms he is in his normal mental state and is competent. Significant Diagnostic Studies:     Labs: Results:       Chemistry Recent Labs     04/16/19  0501 04/15/19  2127   * 113*    135*   K 3.5 3.5    103   CO2 25 25   BUN 13 11   CREA 1.22 1.17   CA 7.9* 8.0*   AGAP 8 7   AP 70 73   TP 6.5 6.4   ALB 2.4* 2.7*   GLOB 4.1* 3.7*   AGRAT 0.6* 0.7*      CBC w/Diff Recent Labs     04/16/19  0501 04/15/19  1442   WBC 10.2 13.1*   RBC 3.68* 4.51   HGB HGB DROP CALLED TO NGA RUANO @ 0545 4/16/19 MM 14.0   HCT 35.6* 42.7    284   GRANS  --  79*   LYMPH  --  8*   EOS  --  0*      Cardiac Enzymes No results for input(s): CPK, CKND1, WILL in the last 72 hours. No lab exists for component: CKRMB, TROIP   Coagulation No results for input(s): PTP, INR, APTT in the last 72 hours. No lab exists for component: INREXT    Lipid Panel Lab Results   Component Value Date/Time    Cholesterol, total 134 08/07/2018 10:55 AM    HDL Cholesterol 45 08/07/2018 10:55 AM    LDL, calculated 74 08/07/2018 10:55 AM    VLDL, calculated 15 08/07/2018 10:55 AM    Triglyceride 75 08/07/2018 10:55 AM      BNP No results for input(s): BNPP in the last 72 hours.    Liver Enzymes Recent Labs     04/16/19  0501   TP 6.5   ALB 2.4*   AP 70   SGOT 18      Thyroid Studies No results found for: T4, T3U, TSH, TSHEXT     EKG:    Sinus tachycardia with Premature atrial complexes  Low voltage QRS  Cannot rule out Anterior infarct , age undetermined  Abnormal ECG  When compared with ECG of 06-MAY-2016 20:03,  Premature atrial complexes are now Present  Minimal criteria for Anterior infarct are now Present     CXR:   PA and lateral views demonstrate patchy infiltrate in the right   middle lobe. Costophrenic angles are clear. Heart is not enlarged     Discharge Exam:  Visit Vitals  BP 97/49   Pulse 88   Temp 98.9 °F (37.2 °C)   Resp 19   Ht 5' 10\" (1.778 m)   Wt 117.9 kg (260 lb)   SpO2 95%   BMI 37.31 kg/m²     General: Alert and oriented. No acute distress. Sitting up at side of hospital bed with  at side. Eye: EOMI, Normal conjunctiva. HENT: Normocephalic, atraumatic, Normal hearing, dry mucous membranes. Respiratory: b/l diminished at bases. R rhonci. Respirations are non-labored. On RA  Cardiovascular: Normal rate, Regular rhythm, No murmur. Gastrointestinal: Soft, Non-tender, nondistended, positive bowel sounds. Musculoskeletal: Normal range of motion, Normal strength, No tenderness, no edema. Integumentary: Warm, Dry, Pink, no rash. Neurologic: Alert, Oriented, No focal deficits. Psychiatric: Cooperative, Appropriate mood & affect. Disposition: home  Discharge Condition: stable  Patient Instructions:   Current Discharge Medication List      START taking these medications    Details   guaiFENesin ER (MUCINEX) 600 mg ER tablet Take 1 Tab by mouth two (2) times a day for 3 days. Qty: 6 Tab, Refills: 0      levoFLOXacin (LEVAQUIN) 750 mg tablet Take 1 Tab by mouth daily for 6 days. Qty: 6 Tab, Refills: 0      albuterol (PROVENTIL HFA, VENTOLIN HFA, PROAIR HFA) 90 mcg/actuation inhaler Take 2 Puffs by inhalation every four (4) hours as needed for Wheezing. Qty: 1 Inhaler, Refills: 0         CONTINUE these medications which have NOT CHANGED    Details   lisinopril (PRINIVIL, ZESTRIL) 40 mg tablet TAKE ONE TABLET BY MOUTH EVERY DAY  Qty: 30 Tab, Refills: 3    Associated Diagnoses: Essential hypertension      atorvastatin (LIPITOR) 80 mg tablet Take 1 Tab by mouth daily. Qty: 30 Tab, Refills: 5      fluticasone (FLONASE) 50 mcg/actuation nasal spray 2 Sprays by Both Nostrils route daily.   Qty: 1 Bottle, Refills: 2    Associated Diagnoses: Acute seasonal allergic rhinitis      aspirin delayed-release 81 mg tablet Take 81 mg by mouth daily. OTHER Shoe insert for lateral heal strike gait  Qty: 1 Insert, Refills: 1    Associated Diagnoses: History of stroke         STOP taking these medications       hydroCHLOROthiazide (HYDRODIURIL) 25 mg tablet Comments:   Reason for Stopping:         glucosamine HCl/chondroitin oliveros (GLUCOSAMINE-CHONDROITIN PO) Comments:   Reason for Stopping:             Discharge Instructions  - Follow up with primary care physician  - Call with any problems or questions. - Take the listed and prescribed medications as directed. - Labs: CBC, BMP in 1-2 days and follow with PCP for results  - Blood cultures and Legionella Pending.  - Please have PCP follow up on final results. - Record BP daily and bring in log to PCP for adjustments as needed in blood pressure medications. - Take antibiotics as prescribed and OTC probiotics to help prevent antibiotic associated diarrhea. - Recommend a healthy diet, lifestyle changes and exercise. - Return for worsening symptoms   -  no extreme activity like basketball or weekend warrior for risk of tendon rupture while on Levaquin.  - hold HCTZ for low blood pressure. Resume when ok by PCP. Activity: No Strenuous exercise while on antibiotics  Diet: Cardiac Diet  Wound Care: None needed    Discussed with Dr. Janiya Fitzgerald  Patient was seen, examined and stable prior to discharge. 35 minutes was spent in the discharge of this patient; at bedside, explaining the instructions, reviewing notes, discussing the case. Patient verbalizes understanding of the instructions and questions were answered to complete satisfaction. Patient is aware of the need to follow up and take medications as prescribed.     Signed:  Jim Taylor PA-C, MPAS  4/16/2019  9:58 AM

## 2019-04-16 NOTE — ED NOTES
Pt allowed one blood collection by this nurse. No success for blood draw. Called lab again for assistance with blood collection.

## 2019-04-16 NOTE — DISCHARGE INSTRUCTIONS
Discharge Instructions  - Follow up with primary care physician  - Call with any problems or questions. - Take the listed and prescribed medications as directed. - Labs: CBC, BMP in 1-2 days and follow with PCP for results  - Blood cultures and Legionella Pending.  - Please have PCP follow up on final results. - Record BP daily and bring in log to PCP for adjustments as needed in blood pressure medications. - Take antibiotics as prescribed and OTC probiotics to help prevent antibiotic associated diarrhea. - Recommend a healthy diet, lifestyle changes and exercise. - Return for worsening symptoms   -  no extreme activity like basketball or weekend warrior for risk of tendon rupture while on Levaquin.  - hold HCTZ for low blood pressure. Resume when ok by PCP.      Activity: No Strenuous exercise while on antibiotics  Diet: Cardiac Diet  Wound Care: None needed

## 2019-04-16 NOTE — ED NOTES
Threatening to leave because he currently does not have a bed assignment upstairs. Gogo Guzman RN attempting to meet pt needs to prevent elopement.

## 2019-04-16 NOTE — ED NOTES
\"since you can't get me a room, Don't you think I can just get some antibiotics and go home? \"  Dr. Abdelrahman German paged.

## 2019-04-17 LAB
L PNEUMO1 AG UR QL IA: NEGATIVE
SPECIMEN SOURCE: NORMAL

## 2019-04-18 NOTE — ED NOTES
Patient called to advise that he began having bloody stools and he is concerned that it is a side effect of the Levaquin that he is on. I advised him that I think that is an unlikely side effect from the Levaquin and that he needs to go see a doctor for evaluation of it. Patient became very upset that I suggested he see a doctor about the medication that a doctor prescribed. I told him that I was not going to get into an argument with him about that and he did need to go see a doctor because he was having bloody stools. He then hung up on me.

## 2019-04-20 LAB
BACTERIA SPEC CULT: NORMAL
SERVICE CMNT-IMP: NORMAL

## 2019-07-02 PROBLEM — H91.21 SUDDEN IDIOPATHIC HEARING LOSS OF RIGHT EAR WITH UNRESTRICTED HEARING OF LEFT EAR: Status: ACTIVE | Noted: 2019-07-02

## 2019-07-18 PROBLEM — M25.561 CHRONIC PAIN OF RIGHT KNEE: Status: ACTIVE | Noted: 2019-07-18

## 2019-07-18 PROBLEM — L91.8 SKIN TAG: Status: ACTIVE | Noted: 2019-07-18

## 2019-07-18 PROBLEM — G89.29 CHRONIC PAIN OF RIGHT KNEE: Status: ACTIVE | Noted: 2019-07-18

## 2019-07-18 PROBLEM — B07.9 WARTS: Status: ACTIVE | Noted: 2019-07-18

## 2019-07-24 PROBLEM — M25.561 CHRONIC PAIN OF RIGHT KNEE: Status: RESOLVED | Noted: 2019-07-18 | Resolved: 2019-07-24

## 2019-07-24 PROBLEM — H91.21 SUDDEN IDIOPATHIC HEARING LOSS OF RIGHT EAR WITH UNRESTRICTED HEARING OF LEFT EAR: Status: RESOLVED | Noted: 2019-07-02 | Resolved: 2019-07-24

## 2019-07-24 PROBLEM — J44.1 COPD EXACERBATION (HCC): Status: ACTIVE | Noted: 2019-07-24

## 2019-07-24 PROBLEM — B07.9 WARTS: Status: RESOLVED | Noted: 2019-07-18 | Resolved: 2019-07-24

## 2019-07-24 PROBLEM — M17.11 ARTHRITIS OF RIGHT KNEE: Status: ACTIVE | Noted: 2019-07-24

## 2019-07-24 PROBLEM — G89.29 CHRONIC PAIN OF RIGHT KNEE: Status: RESOLVED | Noted: 2019-07-18 | Resolved: 2019-07-24

## 2019-08-05 PROBLEM — J18.9 PNEUMONIA: Status: RESOLVED | Noted: 2019-04-15 | Resolved: 2019-08-05

## 2019-08-05 PROBLEM — L91.8 SKIN TAG: Status: RESOLVED | Noted: 2019-07-18 | Resolved: 2019-08-05

## 2019-08-05 PROBLEM — R26.9 GAIT ABNORMALITY: Status: RESOLVED | Noted: 2018-08-07 | Resolved: 2019-08-05

## 2019-08-05 PROBLEM — E11.9 CONTROLLED TYPE 2 DIABETES MELLITUS WITHOUT COMPLICATION, WITHOUT LONG-TERM CURRENT USE OF INSULIN (HCC): Status: ACTIVE | Noted: 2019-08-05

## 2019-08-05 PROBLEM — J44.1 COPD EXACERBATION (HCC): Status: RESOLVED | Noted: 2019-07-24 | Resolved: 2019-08-05

## 2020-05-28 PROBLEM — L82.1 SEBORRHEIC KERATOSES: Status: ACTIVE | Noted: 2020-05-28

## 2020-09-01 PROBLEM — R25.2 LEG CRAMPING: Status: ACTIVE | Noted: 2020-09-01

## 2022-03-18 PROBLEM — M19.90 ARTHRITIS: Status: ACTIVE | Noted: 2018-01-22

## 2022-03-18 PROBLEM — E66.01 SEVERE OBESITY (BMI 35.0-39.9) WITH COMORBIDITY (HCC): Status: ACTIVE | Noted: 2018-03-28

## 2022-03-18 PROBLEM — J30.2 ACUTE SEASONAL ALLERGIC RHINITIS: Status: ACTIVE | Noted: 2018-01-23

## 2022-03-19 PROBLEM — M17.11 ARTHRITIS OF RIGHT KNEE: Status: ACTIVE | Noted: 2019-07-24

## 2022-03-19 PROBLEM — F41.9 ANXIETY: Status: ACTIVE | Noted: 2018-02-28

## 2022-03-19 PROBLEM — E11.9 CONTROLLED TYPE 2 DIABETES MELLITUS WITHOUT COMPLICATION, WITHOUT LONG-TERM CURRENT USE OF INSULIN (HCC): Status: ACTIVE | Noted: 2019-08-05

## 2022-03-19 PROBLEM — R25.2 LEG CRAMPING: Status: ACTIVE | Noted: 2020-09-01

## 2022-03-19 PROBLEM — I10 ESSENTIAL HYPERTENSION: Status: ACTIVE | Noted: 2018-01-22

## 2022-03-20 PROBLEM — E55.9 VITAMIN D DEFICIENCY: Status: ACTIVE | Noted: 2018-08-07

## 2022-03-20 PROBLEM — Z86.73 HISTORY OF STROKE: Status: ACTIVE | Noted: 2018-02-28

## 2022-03-20 PROBLEM — L82.1 SEBORRHEIC KERATOSES: Status: ACTIVE | Noted: 2020-05-28

## 2022-04-04 ENCOUNTER — HOSPITAL ENCOUNTER (EMERGENCY)
Age: 67
Discharge: HOME | End: 2022-04-04
Attending: EMERGENCY MEDICINE
Payer: MEDICARE

## 2022-04-04 ENCOUNTER — APPOINTMENT (OUTPATIENT)
Dept: GENERAL RADIOLOGY | Age: 67
End: 2022-04-04
Attending: EMERGENCY MEDICINE
Payer: MEDICARE

## 2022-04-04 ENCOUNTER — APPOINTMENT (OUTPATIENT)
Dept: GENERAL RADIOLOGY | Age: 67
End: 2022-04-04
Attending: PHYSICIAN ASSISTANT
Payer: MEDICARE

## 2022-04-04 ENCOUNTER — APPOINTMENT (OUTPATIENT)
Dept: CT IMAGING | Age: 67
End: 2022-04-04
Attending: EMERGENCY MEDICINE
Payer: MEDICARE

## 2022-04-04 VITALS
TEMPERATURE: 98.3 F | DIASTOLIC BLOOD PRESSURE: 87 MMHG | RESPIRATION RATE: 16 BRPM | BODY MASS INDEX: 35.79 KG/M2 | OXYGEN SATURATION: 93 % | HEART RATE: 75 BPM | WEIGHT: 250 LBS | SYSTOLIC BLOOD PRESSURE: 134 MMHG | HEIGHT: 70 IN

## 2022-04-04 DIAGNOSIS — S16.1XXA STRAIN OF NECK MUSCLE, INITIAL ENCOUNTER: ICD-10-CM

## 2022-04-04 DIAGNOSIS — S80.01XA CONTUSION OF RIGHT KNEE, INITIAL ENCOUNTER: ICD-10-CM

## 2022-04-04 DIAGNOSIS — S29.012A STRAIN OF RHOMBOID MUSCLE, INITIAL ENCOUNTER: ICD-10-CM
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
## 2022-04-04 PROCEDURE — 72125 CT NECK SPINE W/O DYE: CPT

## 2022-04-04 PROCEDURE — 99284 EMERGENCY DEPT VISIT MOD MDM: CPT

## 2022-04-04 PROCEDURE — 72040 X-RAY EXAM NECK SPINE 2-3 VW: CPT

## 2022-04-04 PROCEDURE — 73562 X-RAY EXAM OF KNEE 3: CPT

## 2022-04-04 PROCEDURE — 70450 CT HEAD/BRAIN W/O DYE: CPT

## 2022-04-04 NOTE — ED NOTES
I have reviewed discharge instructions with the patient. The patient verbalized understanding. Patient left ED via Discharge Method: ambulatory to home. Opportunity for questions and clarification provided. Patient given 0 scripts. To continue your aftercare when you leave the hospital, you may receive an automated call from our care team to check in on how you are doing. This is a free service and part of our promise to provide the best care and service to meet your aftercare needs.  If you have questions, or wish to unsubscribe from this service please call [phone]. Thank you for Choosing our [facility] Emergency Department.

## 2022-04-04 NOTE — ED NOTES
Pt. Ambulated to Xray with Xray technician.  Patient agrees to having his right knee xrayed but refuses an xray of chest. Xray technician informed this RN of patient refusal.
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
## 2022-09-12 ENCOUNTER — OFFICE VISIT (OUTPATIENT)
Dept: ENT CLINIC | Age: 67
End: 2022-09-12
Payer: COMMERCIAL

## 2022-09-12 VITALS — WEIGHT: 250 LBS | BODY MASS INDEX: 35.79 KG/M2 | HEIGHT: 70 IN | RESPIRATION RATE: 18 BRPM

## 2022-09-12 DIAGNOSIS — H90.3 SENSORINEURAL HEARING LOSS (SNHL) OF BOTH EARS: Primary | ICD-10-CM

## 2022-09-12 PROCEDURE — 99203 OFFICE O/P NEW LOW 30 MIN: CPT | Performed by: OTOLARYNGOLOGY

## 2022-09-12 PROCEDURE — G8419 CALC BMI OUT NRM PARAM NOF/U: HCPCS | Performed by: OTOLARYNGOLOGY

## 2022-09-12 PROCEDURE — 3017F COLORECTAL CA SCREEN DOC REV: CPT | Performed by: OTOLARYNGOLOGY

## 2022-09-12 PROCEDURE — G8428 CUR MEDS NOT DOCUMENT: HCPCS | Performed by: OTOLARYNGOLOGY

## 2022-09-12 PROCEDURE — 4004F PT TOBACCO SCREEN RCVD TLK: CPT | Performed by: OTOLARYNGOLOGY

## 2022-09-12 PROCEDURE — 1123F ACP DISCUSS/DSCN MKR DOCD: CPT | Performed by: OTOLARYNGOLOGY

## 2022-09-12 RX ORDER — LISINOPRIL AND HYDROCHLOROTHIAZIDE 25; 20 MG/1; MG/1
1 TABLET ORAL DAILY
COMMUNITY
Start: 2022-06-22

## 2022-09-12 ASSESSMENT — ENCOUNTER SYMPTOMS
VOMITING: 0
COLOR CHANGE: 0
DIARRHEA: 0
WHEEZING: 0
COUGH: 0
EYE PAIN: 0

## 2022-09-12 NOTE — PROGRESS NOTES
Chief Complaint   Patient presents with    Hearing Problem     Patient is here for hearing loss and has hearing aids that he got from here about three years ago. HPI:  Dio Ballard is a 79 y.o. male seen today for HL. He has known SNHL and purchased hearing aids at [email or web address] about 3 yrs ago. He had seen Dr. Darby Powell back in '19. He has not been real happy w/ his current hearing aids. He has trouble using the aids nisha during the humid weather. There is no otalgia, otorrhea or ear pressure. He has no other previous otologic hx. Past Medical History, Past Surgical History, Family history, Social History, and Medications were all reviewed with the patient today and updated as necessary. No Known Allergies  Patient Active Problem List   Diagnosis    Severe obesity (BMI 35.0-39. 9) with comorbidity ([address]    Arthritis    Acute seasonal allergic rhinitis    Essential hypertension    Leg cramping    Arthritis of right knee    Controlled type 2 diabetes mellitus without complication, without long-term current use of insulin (HCC)    Anxiety    History of stroke    Vitamin D deficiency    Seborrheic keratoses     Current Outpatient Medications   Medication Sig    lisinopril-hydroCHLOROthiazide (PRINZIDE;ZESTORETIC) 20-25 MG per tablet Take 1 tablet by mouth daily    albuterol sulfate  (90 Base) MCG/ACT inhaler INHALE TWO PUFFS BY MOUTH EVERY 4 HOURS AS NEEDED FOR WHEEZING    aspirin 81 MG EC tablet Take 81 mg by mouth daily    atorvastatin (LIPITOR) 80 MG tablet Take 80 mg by mouth daily    cyanocobalamin 500 MCG tablet Take 500 mcg by mouth daily    cyclobenzaprine (FLEXERIL) 5 MG tablet Take 5 mg by mouth 2 times daily as needed    fluticasone (FLONASE) 50 MCG/ACT nasal spray ADMINISTER 2 SPRAYS BY BOTH NOSTRILS ROUTE DAILY    hydroCHLOROthiazide (HYDRODIURIL) 25 MG tablet Take 25 mg by mouth daily    lisinopril (PRINIVIL;ZESTRIL) 40 MG tablet Take 40 mg by mouth daily     No current facility-administered medications for this visit. Past Medical History:   Diagnosis Date    Anxiety     Asthma     Back pain, chronic     Calculus of kidney     GERD (gastroesophageal reflux disease)     Hearing reduced     Hypercholesterolemia     Hypertension     Osteoarthritis     Rheumatoid arthritis ([facility] [address]     Stroke ([facility] [address]     Thromboembolus (HCC)      Social History     Tobacco Use    Smoking status: Former     Packs/day: 1.00     Types: Cigarettes     Quit date: 2013     Years since quittin.8    Smokeless tobacco: Never   Substance Use Topics    Alcohol use: Yes     Alcohol/week: 0.0 standard drinks     No past surgical history on file. Family History   Problem Relation Age of Onset    Diabetes Mother     Heart Disease Mother     Stroke Mother     Hypertension Mother     No Known Problems Father         ROS:    Review of Systems   Constitutional:  Negative for chills. HENT:  Positive for hearing loss. Eyes:  Negative for pain. Respiratory:  Negative for cough and wheezing. Cardiovascular:  Negative for chest pain and palpitations. Gastrointestinal:  Negative for diarrhea and vomiting. Skin:  Negative for color change and wound. Allergic/Immunologic: Negative for environmental allergies. Neurological:  Negative for dizziness and headaches. PHYSICAL EXAM:    Resp 18   Ht 5' 10\" (1.778 m)   Wt 250 lb (113.4 kg)   BMI 35.87 kg/m²     General: NAD, well-appearing  Neuro: No gross neuro deficits. CN's II-XII intact. No facial weakness. Eyes: EOMI. Pupils reactive. No periorbital edema/ecchymosis. No nystagmus. Skin: No facial erythema, rashes or concerning lesions. Nose: No external deviations or saddling. Intranasally, septum is midline without perforations, nasal mucosa appears healthy with no erythema, mucopurulence, or polyps. Mouth: Moist mucus membranes, normal tongue/palate mobility, no concerning mucosal lesions.  Oropharynx clear with no erythema/exudate, no tonsillar hypertrophy. Ears: Normal appearing auricles, no hematomas. Clear canals, intact TM's, no MEEs. Neck: Soft, supple, no palpable lateral neck masses. No palpable parotid or submandibular masses. No thyromegaly or palpable thyroid nodules. No surgical scars. Lymphatics: No palpable cervical LAD. Resp: No audible stridor or wheezing. CV: No murmurs, no JVD. Extremities: No clubbing or cyanosis. ASSESSMENT and PLAN      ICD-10-CM    1. Sensorineural hearing loss (SNHL) of both ears  H90.3         His ears were clear and healthy on my exam today. He wants to consider new hearing aids but our Audiology dept is out of network with [facility]. He will work w/ [facility] to consider a new set of hearing aids.      Angela Flowers MD  9/12/2022    Electronically signed by Angela Flowers MD on 9/12/2022 at 9:35 AM

## (undated) DEVICE — 3M™ TEGADERM™ TRANSPARENT FILM DRESSING FRAME STYLE, 1626W, 4 IN X 4-3/4 IN (10 CM X 12 CM), 50/CT 4CT/CASE: Brand: 3M™ TEGADERM™

## (undated) DEVICE — EYE SPEAR / FINE DISSECTOR: Brand: DEROYAL

## (undated) DEVICE — (D)STRIP SKN CLSR 0.5X4IN WHT --

## (undated) DEVICE — Z DISCONTINUED NO SUB IDED SHIELD EYE PLAS RT BGE M 7.5CMX5.7CM VISITEC

## (undated) DEVICE — NEEDLE HYPO 27GA L1.25IN GRY POLYPR HUB S STL REG BVL STR

## (undated) DEVICE — NEEDLE HYPO 25GA L5/8IN ORNG HUB S STL LATCH BVL UP

## (undated) DEVICE — SYR 50ML LR LCK 1ML GRAD NSAF --

## (undated) DEVICE — IRRIGATION KT OPHTH 80IN --

## (undated) DEVICE — SOLUTION IRRIGATION BAL SALT SOLUTION 500 ML BTL 6/CA BSS +

## (undated) DEVICE — CONSTELLATION VISION SYSTEM 5000 CPM ULTRAVIT PROBE STRAIGHT ENDOILLUMINATOR 25+ TOTALPLUS VITRECTOMY PAK EDGEPLUS BLADE VALVED ENTRY SYSTEM: Brand: CONSTELLATION, ULTRAVIT, 25+, TOTALPLUS, EDGEPLUS

## (undated) DEVICE — COVER,MAYO STAND,STERILE: Brand: MEDLINE

## (undated) DEVICE — SOLUTION IRRIGATION BAL SALT SOLUTION 500 ML STRL BSS

## (undated) DEVICE — SURGICAL PROCEDURE PACK EYE CDS

## (undated) DEVICE — SOLUTION IV STRL H2O 500 ML AQUALITE POUR BTL

## (undated) DEVICE — PAD,EYE,1-5/8X2 5/8,STERILE,LF,1/PK: Brand: MEDLINE

## (undated) DEVICE — SYR 5ML 1/5 GRAD LL NSAF LF --